# Patient Record
Sex: FEMALE | Race: WHITE | URBAN - METROPOLITAN AREA
[De-identification: names, ages, dates, MRNs, and addresses within clinical notes are randomized per-mention and may not be internally consistent; named-entity substitution may affect disease eponyms.]

---
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
## 2025-06-18 ENCOUNTER — APPOINTMENT (OUTPATIENT)
Dept: OTOLARYNGOLOGY | Facility: CLINIC | Age: 20
End: 2025-06-18

## 2025-06-18 VITALS — BODY MASS INDEX: 19.66 KG/M2 | HEIGHT: 65 IN | WEIGHT: 118 LBS

## 2025-06-18 PROBLEM — R09.89 PHLEGM IN THROAT: Status: ACTIVE | Noted: 2025-06-18

## 2025-06-18 PROBLEM — H61.23 BILATERAL IMPACTED CERUMEN: Status: ACTIVE | Noted: 2025-06-18

## 2025-06-18 PROBLEM — H93.8X3 CLOGGED EAR, BILATERAL: Status: ACTIVE | Noted: 2025-06-18

## 2025-06-18 PROBLEM — R59.0 CERVICAL LYMPHADENOPATHY: Status: ACTIVE | Noted: 2025-06-18

## 2025-06-18 PROBLEM — R09.81 NASAL CONGESTION: Status: ACTIVE | Noted: 2025-06-18

## 2025-06-18 PROBLEM — R09.82 PND (POST-NASAL DRIP): Status: ACTIVE | Noted: 2025-06-18

## 2025-06-18 PROBLEM — R49.0 HOARSENESS: Status: ACTIVE | Noted: 2025-06-18

## 2025-06-18 PROCEDURE — 99204 OFFICE O/P NEW MOD 45 MIN: CPT | Mod: 25

## 2025-06-18 PROCEDURE — 31231 NASAL ENDOSCOPY DX: CPT

## 2025-08-15 ENCOUNTER — RESULT REVIEW (OUTPATIENT)
Age: 20
End: 2025-08-15

## 2025-08-15 ENCOUNTER — OUTPATIENT (OUTPATIENT)
Dept: OUTPATIENT SERVICES | Facility: HOSPITAL | Age: 20
LOS: 1 days | End: 2025-08-15
Payer: MEDICAID

## 2025-08-15 DIAGNOSIS — Z90.89 ACQUIRED ABSENCE OF OTHER ORGANS: Chronic | ICD-10-CM

## 2025-08-15 DIAGNOSIS — R59.0 LOCALIZED ENLARGED LYMPH NODES: ICD-10-CM

## 2025-08-15 DIAGNOSIS — Z98.890 OTHER SPECIFIED POSTPROCEDURAL STATES: Chronic | ICD-10-CM

## 2025-08-15 DIAGNOSIS — Z00.8 ENCOUNTER FOR OTHER GENERAL EXAMINATION: ICD-10-CM

## 2025-08-15 PROCEDURE — 76536 US EXAM OF HEAD AND NECK: CPT | Mod: 26

## 2025-08-15 PROCEDURE — 76536 US EXAM OF HEAD AND NECK: CPT

## 2025-08-16 DIAGNOSIS — R59.0 LOCALIZED ENLARGED LYMPH NODES: ICD-10-CM
